# Patient Record
(demographics unavailable — no encounter records)

---

## 2025-05-19 NOTE — PHYSICAL EXAM
[de-identified] : Well-appearing male in no acute distress.  Alert and oriented x 3.  Appears his stated age.  Normal mood and affect.  He is Is tight on his left side as is his hamstring.  He has some tenderness over the insertion of his Achilles.  No significant thickening here.  No pain with resisted ankle plantarflexion.  No medial or lateral tenderness. [de-identified] : 3 views left ankle ordered and reviewed.  These demonstrate no acute osseous abnormalities.  Joint spaces well-maintained

## 2025-05-19 NOTE — DISCUSSION/SUMMARY
[de-identified] : I had a long discussion with the patient about the nature of their condition and all available treatment options. We discussed operative and non-operative interventions. After a long discussion, they would like to proceed with structured physical therapy. We also discussed use of anti-inflammatory medications as well as their risks and benefits. I will see them back in approximately 6 weeks time. If they are still having pain, I would recommend an MRI. All of their questions were answered, they agree with the treatment plan

## 2025-05-19 NOTE — HISTORY OF PRESENT ILLNESS
[de-identified] : Very pleasant 31-year-old gentleman comes in with left Achilles discomfort.  This has been going on for the last year and a half.  No recent trauma.  No recent treatment.  Pain is mostly around the posterior aspect of his ankle.

## 2025-05-19 NOTE — PHYSICAL EXAM
[de-identified] : Well-appearing male in no acute distress.  Alert and oriented x 3.  Appears his stated age.  Normal mood and affect.  He is Is tight on his left side as is his hamstring.  He has some tenderness over the insertion of his Achilles.  No significant thickening here.  No pain with resisted ankle plantarflexion.  No medial or lateral tenderness. [de-identified] : 3 views left ankle ordered and reviewed.  These demonstrate no acute osseous abnormalities.  Joint spaces well-maintained

## 2025-05-19 NOTE — DISCUSSION/SUMMARY
[de-identified] : I had a long discussion with the patient about the nature of their condition and all available treatment options. We discussed operative and non-operative interventions. After a long discussion, they would like to proceed with structured physical therapy. We also discussed use of anti-inflammatory medications as well as their risks and benefits. I will see them back in approximately 6 weeks time. If they are still having pain, I would recommend an MRI. All of their questions were answered, they agree with the treatment plan Елена

## 2025-05-19 NOTE — HISTORY OF PRESENT ILLNESS
[de-identified] : Very pleasant 31-year-old gentleman comes in with left Achilles discomfort.  This has been going on for the last year and a half.  No recent trauma.  No recent treatment.  Pain is mostly around the posterior aspect of his ankle.